# Patient Record
Sex: FEMALE | Race: ASIAN | Employment: UNEMPLOYED | URBAN - METROPOLITAN AREA
[De-identification: names, ages, dates, MRNs, and addresses within clinical notes are randomized per-mention and may not be internally consistent; named-entity substitution may affect disease eponyms.]

---

## 2017-09-03 PROBLEM — F41.9 ANXIETY: Status: ACTIVE | Noted: 2017-09-03

## 2017-09-03 PROBLEM — Z72.0 TOBACCO USE: Status: ACTIVE | Noted: 2017-09-03

## 2018-09-05 PROBLEM — O09.529 AMA (ADVANCED MATERNAL AGE) MULTIGRAVIDA 35+: Status: ACTIVE | Noted: 2018-09-05

## 2018-09-05 PROCEDURE — 88175 CYTOPATH C/V AUTO FLUID REDO: CPT | Performed by: OBSTETRICS & GYNECOLOGY

## 2018-09-05 PROCEDURE — 87086 URINE CULTURE/COLONY COUNT: CPT | Performed by: OBSTETRICS & GYNECOLOGY

## 2018-09-05 PROCEDURE — 87624 HPV HI-RISK TYP POOLED RSLT: CPT | Performed by: OBSTETRICS & GYNECOLOGY

## 2018-09-10 PROCEDURE — 86780 TREPONEMA PALLIDUM: CPT | Performed by: OBSTETRICS & GYNECOLOGY

## 2018-09-10 PROCEDURE — 86850 RBC ANTIBODY SCREEN: CPT | Performed by: OBSTETRICS & GYNECOLOGY

## 2018-09-10 PROCEDURE — 86900 BLOOD TYPING SEROLOGIC ABO: CPT | Performed by: OBSTETRICS & GYNECOLOGY

## 2018-09-10 PROCEDURE — 86901 BLOOD TYPING SEROLOGIC RH(D): CPT | Performed by: OBSTETRICS & GYNECOLOGY

## 2018-09-10 PROCEDURE — 86762 RUBELLA ANTIBODY: CPT | Performed by: OBSTETRICS & GYNECOLOGY

## 2018-09-10 PROCEDURE — 87389 HIV-1 AG W/HIV-1&-2 AB AG IA: CPT | Performed by: OBSTETRICS & GYNECOLOGY

## 2018-11-15 NOTE — PROGRESS NOTES
Outpatient Maternal-Fetal Medicine Consultation    Dear Dr. Maciej Durant,    Thank you for requesting ultrasound evaluation and maternal fetal medicine consultation on your patient Carlton Buerger.   As you are aware she is a 43year old female with a [place] pr 07/03/2018   General: alert and oriented,no acute distress  Abdomen: gravid, soft, non-tender  Extremities: non-tender, no edema      OBSTETRIC ULTRASOUND  A level II ultrasound was performed prior to the consultation which I interpreted and discussed with and is as high as 28 percent in women over age 48. The incidence of gestational diabetes in the general obstetric population is 3 percent, rising to 7 to 15 percent in women over age 36 and 21 percent in women over age 48.   Women 28years of age or older cannot reliably exclude potential genetic abnormalities. Given that the patient will be 43years old at the time of delivery I reviewed that her risk (at amniocentesis) of having a fetus with any chromosome abnormality is 1:50 and with trisomy 24 is 1: 80. fetal Zika virus infection. The minimum time between occurrence of maternal Zika virus infection and development of sonographic signs suggestive of fetal infection is not known.  In women infected early in pregnancy, ultrasound findings associated with f infection with an increased risk for congenital microcephaly and other abnormalities of the brain and eye prompted the [place]  Centers for Disease Control and Prevention (CDC) to issue a travel advisory for pregnant women recommending that “Pregnant women in pregnancy, 21 (26%) relapsed compared with 44 (68%) of the 72 women who discontinued medication.  This study estimated that women who discontinued medication were 5 times as likely to relapse as compared to women who maintained treatment.     Decisions rega major congenital malformations in newborns born in the [place] is estimated to be between 2 and 4%. During the earliest stages of pregnancy, formation of major organ systems takes place and is complete within the first 12 weeks after conception.  Ther (CNS) remains particularly vulnerable to toxic agents throughout pregnancy.  While exposures to teratogens early in pregnancy may result in clear abnormalities, exposures that occur after neural tube closure (at 32 days of gestation) may produce more subtle

## 2018-11-20 ENCOUNTER — HOSPITAL ENCOUNTER (OUTPATIENT)
Dept: PERINATAL CARE | Facility: HOSPITAL | Age: 38
Discharge: HOME | End: 2018-11-20
Attending: OBSTETRICS & GYNECOLOGY
Payer: COMMERCIAL

## 2018-11-20 VITALS — SYSTOLIC BLOOD PRESSURE: 104 MMHG | DIASTOLIC BLOOD PRESSURE: 60 MMHG | HEART RATE: 84 BPM

## 2018-11-20 VITALS — SYSTOLIC BLOOD PRESSURE: 111 MMHG | HEART RATE: 96 BPM | DIASTOLIC BLOOD PRESSURE: 61 MMHG

## 2018-11-20 DIAGNOSIS — Z20.821 ZIKA VIRUS EXPOSURE: ICD-10-CM

## 2018-11-20 DIAGNOSIS — F41.9 ANXIETY: ICD-10-CM

## 2018-11-20 DIAGNOSIS — O09.529 AMA (ADVANCED MATERNAL AGE) MULTIGRAVIDA 35+: ICD-10-CM

## 2018-11-20 DIAGNOSIS — O09.522 MULTIGRAVIDA OF ADVANCED MATERNAL AGE IN SECOND TRIMESTER: ICD-10-CM

## 2018-11-20 DIAGNOSIS — O09.529 AMA (ADVANCED MATERNAL AGE) MULTIGRAVIDA 35+: Primary | ICD-10-CM

## 2018-11-20 PROCEDURE — 76811 OB US DETAILED SNGL FETUS: CPT | Performed by: OBSTETRICS & GYNECOLOGY

## 2018-11-20 PROCEDURE — 99243 OFF/OP CNSLTJ NEW/EST LOW 30: CPT | Performed by: OBSTETRICS & GYNECOLOGY

## 2018-11-20 NOTE — PROGRESS NOTES
Pt for level 2 US  HX ama  zika exposure [place] and [place]  Pt states she had bits and no s/s partner did not have bites or S/S  Low risk cfdna female fetus  Denies pregnancy complaints  States active fetus

## 2019-01-11 PROCEDURE — 87389 HIV-1 AG W/HIV-1&-2 AB AG IA: CPT | Performed by: OBSTETRICS & GYNECOLOGY

## 2019-01-11 PROCEDURE — 86780 TREPONEMA PALLIDUM: CPT | Performed by: OBSTETRICS & GYNECOLOGY

## 2019-02-13 ENCOUNTER — HOSPITAL ENCOUNTER (OUTPATIENT)
Dept: PERINATAL CARE | Facility: HOSPITAL | Age: 39
Discharge: HOME | End: 2019-02-13
Attending: OBSTETRICS & GYNECOLOGY
Payer: COMMERCIAL

## 2019-02-13 VITALS — HEART RATE: 85 BPM | SYSTOLIC BLOOD PRESSURE: 121 MMHG | DIASTOLIC BLOOD PRESSURE: 72 MMHG

## 2019-02-13 DIAGNOSIS — O09.523 MULTIGRAVIDA OF ADVANCED MATERNAL AGE IN THIRD TRIMESTER: ICD-10-CM

## 2019-02-13 DIAGNOSIS — Z72.0 TOBACCO USE: ICD-10-CM

## 2019-02-13 DIAGNOSIS — O09.523 MULTIGRAVIDA OF ADVANCED MATERNAL AGE IN THIRD TRIMESTER: Primary | ICD-10-CM

## 2019-02-13 PROCEDURE — 76805 OB US >/= 14 WKS SNGL FETUS: CPT | Performed by: OBSTETRICS & GYNECOLOGY

## 2019-02-13 PROCEDURE — 99213 OFFICE O/P EST LOW 20 MIN: CPT | Performed by: OBSTETRICS & GYNECOLOGY

## 2019-02-13 NOTE — PROGRESS NOTES
Outpatient Maternal-Fetal Medicine Consultation    Dear Dr. Ania Escalona,    Thank you for requesting ultrasound evaluation and maternal fetal medicine consultation on your patient Diana Mooney.   As you are aware she is a 43year old female with a [place] pr gravid, soft, non-tender  Extremities: non-tender, no edema        DISCUSSION  During her visit we discussed and reviewed the following issues:  ADVANCED MATERNAL AGE    Background  I reviewed with the patient that pregnancies in women of advanced maternal

## 2019-03-28 ENCOUNTER — HOSPITAL ENCOUNTER (INPATIENT)
Facility: HOSPITAL | Age: 39
LOS: 3 days | Discharge: HOME | End: 2019-03-31
Attending: OBSTETRICS & GYNECOLOGY | Admitting: OBSTETRICS & GYNECOLOGY
Payer: COMMERCIAL

## 2019-03-28 PROBLEM — Z3A.38 38 WEEKS GESTATION OF PREGNANCY: Status: ACTIVE | Noted: 2019-03-28

## 2019-03-28 PROCEDURE — 85027 COMPLETE CBC AUTOMATED: CPT | Performed by: OBSTETRICS & GYNECOLOGY

## 2019-03-28 PROCEDURE — 86850 RBC ANTIBODY SCREEN: CPT | Performed by: OBSTETRICS & GYNECOLOGY

## 2019-03-28 PROCEDURE — 86900 BLOOD TYPING SEROLOGIC ABO: CPT | Performed by: OBSTETRICS & GYNECOLOGY

## 2019-03-28 PROCEDURE — 86901 BLOOD TYPING SEROLOGIC RH(D): CPT | Performed by: OBSTETRICS & GYNECOLOGY

## 2019-03-28 RX ORDER — AMMONIA INHALANTS 0.04 G/.3ML
0.3 INHALANT RESPIRATORY (INHALATION) AS NEEDED
Status: DISCONTINUED | OUTPATIENT
Start: 2019-03-28 | End: 2019-03-29

## 2019-03-28 RX ORDER — TERBUTALINE SULFATE 1 MG/ML
0.25 INJECTION, SOLUTION SUBCUTANEOUS AS NEEDED
Status: DISCONTINUED | OUTPATIENT
Start: 2019-03-28 | End: 2019-03-29 | Stop reason: HOSPADM

## 2019-03-28 RX ORDER — NALBUPHINE HCL 10 MG/ML
2.5 AMPUL (ML) INJECTION
Status: DISCONTINUED | OUTPATIENT
Start: 2019-03-28 | End: 2019-03-29 | Stop reason: ALTCHOICE

## 2019-03-28 RX ORDER — LIDOCAINE HYDROCHLORIDE AND EPINEPHRINE 20; 5 MG/ML; UG/ML
20 INJECTION, SOLUTION EPIDURAL; INFILTRATION; INTRACAUDAL; PERINEURAL ONCE
Status: COMPLETED | OUTPATIENT
Start: 2019-03-28 | End: 2019-03-28

## 2019-03-28 RX ORDER — IBUPROFEN 600 MG/1
600 TABLET ORAL ONCE AS NEEDED
Status: DISCONTINUED | OUTPATIENT
Start: 2019-03-28 | End: 2019-03-29 | Stop reason: HOSPADM

## 2019-03-28 RX ORDER — EPHEDRINE SULFATE/0.9% NACL/PF 25 MG/5 ML
5 SYRINGE (ML) INTRAVENOUS AS NEEDED
Status: DISCONTINUED | OUTPATIENT
Start: 2019-03-28 | End: 2019-03-29 | Stop reason: ALTCHOICE

## 2019-03-28 RX ORDER — SODIUM CHLORIDE, SODIUM LACTATE, POTASSIUM CHLORIDE, CALCIUM CHLORIDE 600; 310; 30; 20 MG/100ML; MG/100ML; MG/100ML; MG/100ML
INJECTION, SOLUTION INTRAVENOUS CONTINUOUS
Status: DISCONTINUED | OUTPATIENT
Start: 2019-03-28 | End: 2019-03-29 | Stop reason: HOSPADM

## 2019-03-28 RX ORDER — LIDOCAINE HYDROCHLORIDE 10 MG/ML
30 INJECTION, SOLUTION EPIDURAL; INFILTRATION; INTRACAUDAL; PERINEURAL ONCE
Status: DISCONTINUED | OUTPATIENT
Start: 2019-03-28 | End: 2019-03-29 | Stop reason: HOSPADM

## 2019-03-28 RX ORDER — BUPIVACAINE HYDROCHLORIDE 2.5 MG/ML
15 INJECTION, SOLUTION EPIDURAL; INFILTRATION; INTRACAUDAL ONCE
Status: COMPLETED | OUTPATIENT
Start: 2019-03-28 | End: 2019-03-28

## 2019-03-28 RX ORDER — ACETAMINOPHEN 500 MG
500 TABLET ORAL ONCE AS NEEDED
Status: DISCONTINUED | OUTPATIENT
Start: 2019-03-28 | End: 2019-03-29 | Stop reason: HOSPADM

## 2019-03-28 RX ORDER — SODIUM CHLORIDE 0.9 % (FLUSH) 0.9 %
10 SYRINGE (ML) INJECTION AS NEEDED
Status: DISCONTINUED | OUTPATIENT
Start: 2019-03-28 | End: 2019-03-29 | Stop reason: HOSPADM

## 2019-03-28 RX ORDER — TRISODIUM CITRATE DIHYDRATE AND CITRIC ACID MONOHYDRATE 500; 334 MG/5ML; MG/5ML
30 SOLUTION ORAL AS NEEDED
Status: DISCONTINUED | OUTPATIENT
Start: 2019-03-28 | End: 2019-03-29 | Stop reason: HOSPADM

## 2019-03-28 RX ORDER — ONDANSETRON 2 MG/ML
4 INJECTION INTRAMUSCULAR; INTRAVENOUS EVERY 6 HOURS PRN
Status: DISCONTINUED | OUTPATIENT
Start: 2019-03-28 | End: 2019-03-29 | Stop reason: HOSPADM

## 2019-03-28 RX ADMIN — LIDOCAINE HYDROCHLORIDE 3 ML: 10 INJECTION, SOLUTION EPIDURAL; INFILTRATION; INTRACAUDAL; PERINEURAL at 23:51:00

## 2019-03-29 ENCOUNTER — ANESTHESIA EVENT (OUTPATIENT)
Dept: OBGYN UNIT | Facility: HOSPITAL | Age: 39
End: 2019-03-29
Payer: COMMERCIAL

## 2019-03-29 ENCOUNTER — ANESTHESIA (OUTPATIENT)
Dept: OBGYN UNIT | Facility: HOSPITAL | Age: 39
End: 2019-03-29
Payer: COMMERCIAL

## 2019-03-29 PROCEDURE — 0HQ9XZZ REPAIR PERINEUM SKIN, EXTERNAL APPROACH: ICD-10-PCS | Performed by: OBSTETRICS & GYNECOLOGY

## 2019-03-29 RX ORDER — FAMOTIDINE 20 MG/1
20 TABLET ORAL 2 TIMES DAILY
Status: DISCONTINUED | OUTPATIENT
Start: 2019-03-29 | End: 2019-03-31

## 2019-03-29 RX ORDER — IBUPROFEN 200 MG
400 TABLET ORAL EVERY 4 HOURS PRN
Status: DISCONTINUED | OUTPATIENT
Start: 2019-03-29 | End: 2019-03-31

## 2019-03-29 RX ORDER — LIDOCAINE HYDROCHLORIDE AND EPINEPHRINE 15; 5 MG/ML; UG/ML
INJECTION, SOLUTION EPIDURAL AS NEEDED
Status: DISCONTINUED | OUTPATIENT
Start: 2019-03-29 | End: 2019-03-29 | Stop reason: SURG

## 2019-03-29 RX ORDER — PRENATAL VIT,CAL 76/IRON/FOLIC 29 MG-1 MG
1 TABLET ORAL DAILY
Status: DISCONTINUED | OUTPATIENT
Start: 2019-03-29 | End: 2019-03-31

## 2019-03-29 RX ORDER — BISACODYL 10 MG
10 SUPPOSITORY, RECTAL RECTAL ONCE AS NEEDED
Status: DISCONTINUED | OUTPATIENT
Start: 2019-03-29 | End: 2019-03-31

## 2019-03-29 RX ORDER — FAMOTIDINE 10 MG/ML
20 INJECTION, SOLUTION INTRAVENOUS 2 TIMES DAILY
Status: DISCONTINUED | OUTPATIENT
Start: 2019-03-29 | End: 2019-03-31

## 2019-03-29 RX ORDER — IBUPROFEN 200 MG
200 TABLET ORAL EVERY 4 HOURS PRN
Status: DISCONTINUED | OUTPATIENT
Start: 2019-03-29 | End: 2019-03-31

## 2019-03-29 RX ORDER — EPHEDRINE SULFATE/0.9% NACL/PF 25 MG/5 ML
5 SYRINGE (ML) INTRAVENOUS AS NEEDED
Status: DISCONTINUED | OUTPATIENT
Start: 2019-03-29 | End: 2019-03-29 | Stop reason: ALTCHOICE

## 2019-03-29 RX ORDER — HYDROCODONE BITARTRATE AND ACETAMINOPHEN 5; 325 MG/1; MG/1
2 TABLET ORAL EVERY 4 HOURS PRN
Status: DISCONTINUED | OUTPATIENT
Start: 2019-03-29 | End: 2019-03-31

## 2019-03-29 RX ORDER — IBUPROFEN 600 MG/1
600 TABLET ORAL EVERY 6 HOURS
Status: DISCONTINUED | OUTPATIENT
Start: 2019-03-29 | End: 2019-03-31

## 2019-03-29 RX ORDER — LIDOCAINE HYDROCHLORIDE 10 MG/ML
INJECTION, SOLUTION EPIDURAL; INFILTRATION; INTRACAUDAL; PERINEURAL AS NEEDED
Status: DISCONTINUED | OUTPATIENT
Start: 2019-03-28 | End: 2019-03-29 | Stop reason: SURG

## 2019-03-29 RX ORDER — NALBUPHINE HCL 10 MG/ML
2.5 AMPUL (ML) INJECTION
Status: DISCONTINUED | OUTPATIENT
Start: 2019-03-29 | End: 2019-03-29 | Stop reason: ALTCHOICE

## 2019-03-29 RX ORDER — SODIUM CHLORIDE 0.9 % (FLUSH) 0.9 %
10 SYRINGE (ML) INJECTION AS NEEDED
Status: DISCONTINUED | OUTPATIENT
Start: 2019-03-29 | End: 2019-03-31

## 2019-03-29 RX ORDER — HYDROCODONE BITARTRATE AND ACETAMINOPHEN 5; 325 MG/1; MG/1
1 TABLET ORAL EVERY 4 HOURS PRN
Status: DISCONTINUED | OUTPATIENT
Start: 2019-03-29 | End: 2019-03-31

## 2019-03-29 RX ORDER — DIAPER,BRIEF,INFANT-TODD,DISP
1 EACH MISCELLANEOUS EVERY 6 HOURS PRN
Status: DISCONTINUED | OUTPATIENT
Start: 2019-03-29 | End: 2019-03-31

## 2019-03-29 RX ORDER — DOCUSATE SODIUM 100 MG/1
100 CAPSULE, LIQUID FILLED ORAL 2 TIMES DAILY
Status: DISCONTINUED | OUTPATIENT
Start: 2019-03-29 | End: 2019-03-31

## 2019-03-29 RX ORDER — IBUPROFEN 600 MG/1
600 TABLET ORAL EVERY 4 HOURS PRN
Status: DISCONTINUED | OUTPATIENT
Start: 2019-03-29 | End: 2019-03-31

## 2019-03-29 RX ORDER — ONDANSETRON 2 MG/ML
4 INJECTION INTRAMUSCULAR; INTRAVENOUS EVERY 6 HOURS PRN
Status: DISCONTINUED | OUTPATIENT
Start: 2019-03-29 | End: 2019-03-31

## 2019-03-29 RX ORDER — AMMONIA INHALANTS 0.04 G/.3ML
0.3 INHALANT RESPIRATORY (INHALATION) AS NEEDED
Status: DISCONTINUED | OUTPATIENT
Start: 2019-03-29 | End: 2019-03-31

## 2019-03-29 RX ORDER — SIMETHICONE 80 MG
80 TABLET,CHEWABLE ORAL 3 TIMES DAILY PRN
Status: DISCONTINUED | OUTPATIENT
Start: 2019-03-29 | End: 2019-03-31

## 2019-03-29 RX ADMIN — LIDOCAINE HYDROCHLORIDE AND EPINEPHRINE 3 ML: 15; 5 INJECTION, SOLUTION EPIDURAL at 00:00:00

## 2019-03-29 NOTE — PROGRESS NOTES
[facility]    Labor Progress Note    Fernandez Julien Patient Status:  Inpatient    10/3/1980 MRN [id number]   Location [address]  Attending Kennedi Jerome MD   Hosp Day # 1 PCP DO Keven Mcgrath

## 2019-03-29 NOTE — H&P
[facility] Patient Status:  Observation    10/3/1980 MRN [id number]   Location [facility] Attending Ronnie Parkinson MD   Hosp Day # 0 PCP Haim Bhardwaj DO     Active Problems:    38 week dry.   Psychiatric: She has a normal mood and affect. Her behavior is normal. Judgment and thought content normal.   Vitals reviewed.       Assessment:  37yo  at 38+2 in labor    -- hx of SVDx2  -- GBS neg  -- AMA with normal level 2  -- mild anemia

## 2019-03-29 NOTE — PAYOR COMM NOTE
--------------  ADMISSION REVIEW     Payor: [employer] PPO  Subscriber #:  [id number]  Authorization Number: [id number]    Admit date: 3/28/19  Admit time: 2038       Admitting Physician: Ronnie Parkinson MD  Attending Physician:  Natalia Watkins

## 2019-03-29 NOTE — ANESTHESIA PREPROCEDURE EVALUATION
Anesthesia PreOp Note    HPI:     Shaina Tobar is a 43year old female who presents for preoperative consultation requested by: * No surgeons listed *    Date of Surgery: 3/28/2019 - 3/29/2019    * No procedures listed *  Indication: * No pre-op diagnos infusion 300 mL/hr Intravenous Continuous Vira Peña MD Stopped at 03/28/19 2030    Terbutaline Sulfate (BRETHINE) 1 MG/ML injection 0.25 mg 0.25 mg Subcutaneous PRN Vira Peña MD     Sod Citrate-Citric Acid [facility]) solution 30 mL 30 mL on file    Occupational History      Not on file    Social Needs      Financial resource strain: Not on file      Food insecurity:        Worry: Not on file        Inability: Not on file      Transportation needs:        Medical: Not on file        Non-med °F (36.7 °C). Her blood pressure is 116/70 and her pulse is 87.  Her respiration is 16.    03/28/19  2200 03/28/19  2300   BP: 104/56 116/70   Pulse: 78 87   Resp: 16    Temp: 98.1 °F (36.7 °C)    TempSrc: Oral    Weight: 70.8 kg (156 lb)    Height: 1.575 m

## 2019-03-29 NOTE — LACTATION NOTE
LACTATION NOTE - MOTHER      Evaluation Type: Inpatient    Problems identified  Problems identified: Knowledge deficit    Maternal history  Maternal history: Anxiety    Breastfeeding goal  Breastfeeding goal: To maintain breast milk feeding per patient Elton Waterman means or via a bottle. Post-discharge breastfeeding resources reviewed and encouraged to call for assistance with breastfeeding attempts as needed. RESPONSE: Patient accepted information and verbalized understanding of information. Will follow PRN.

## 2019-03-29 NOTE — ANESTHESIA POSTPROCEDURE EVALUATION
Patient: Mayela Man    Procedure Summary     Date:  03/28/19 Room / Location:      Anesthesia Start:  2348 Anesthesia Stop:  03/29/19 0540    Procedure:  LABOR ANALGESIA Diagnosis:      Scheduled Providers:   Anesthesiologist:  Kasia Osorio MD

## 2019-03-29 NOTE — ANESTHESIA PROCEDURE NOTES
Labor Analgesia  Performed by: Julia Singleton MD  Authorized by: Julia Singleton MD     Patient Location:  OB  Start Time:  3/28/2019 11:48 PM  End Time:  3/29/2019 12:05 AM  Site Identification: surface landmarks    Reason for Block: labor epidur

## 2019-03-29 NOTE — L&D DELIVERY NOTE
[facility]    Vaginal Delivery Note    Jarocho Lav Patient Status:  Inpatient    10/3/1980 MRN [id number]   Location [address] Attending Marisela Gama MD   [facility] Day # 1 PCP DO Viridiana Herrera

## 2019-03-30 PROBLEM — Z3A.38 38 WEEKS GESTATION OF PREGNANCY: Status: RESOLVED | Noted: 2019-03-28 | Resolved: 2019-03-30

## 2019-03-30 PROBLEM — O09.529 AMA (ADVANCED MATERNAL AGE) MULTIGRAVIDA 35+: Status: RESOLVED | Noted: 2018-09-05 | Resolved: 2019-03-30

## 2019-03-30 PROCEDURE — 97116 GAIT TRAINING THERAPY: CPT

## 2019-03-30 PROCEDURE — 97162 PT EVAL MOD COMPLEX 30 MIN: CPT

## 2019-03-30 PROCEDURE — 85025 COMPLETE CBC W/AUTO DIFF WBC: CPT | Performed by: OBSTETRICS & GYNECOLOGY

## 2019-03-30 PROCEDURE — 97530 THERAPEUTIC ACTIVITIES: CPT

## 2019-03-30 NOTE — PROGRESS NOTES
[place] [facility] - [facility]    OB/GYNE Progress Note      Shaina Tobar Patient Status:  Inpatient    10/3/1980 MRN [id number]   Location [facility] 3SE Attending Cassandra Morejon MD   Hosp Day # 2 PCP Caitie Gilbert,        Assessment/Plan

## 2019-03-30 NOTE — PLAN OF CARE
GENITOURINARY - ADULT    • Absence of urinary retention Completed          PAIN - ADULT    • Verbalizes/displays adequate comfort level or patient's stated pain goal Not Progressing          ANXIETY    • Will report anxiety at manageable levels Progressing

## 2019-03-30 NOTE — PHYSICAL THERAPY NOTE
PHYSICAL THERAPY EVALUATION - INPATIENT     Room Number: 353/353-A  Evaluation Date: 3/30/2019  Type of Evaluation: Initial   Physician Order: PT Eval and Treat    Presenting Problem: pubic symphysis pain  Reason for Therapy: Mobility Dysfunction and Disc time that may aggravate symptoms, encouraged to use log roll technique, pair transfers with practiced breathing technique, and use abdominal binder for comfort.  Pt and  educated on recommendation for continued PT with OPPT services once d/c'd from h to Enter : 5  Railing: Yes  Stairs to Bedroom: 18  Railing: Yes    Lives With: Spouse  Drives: Yes  Patient Owned Equipment: None  Patient Regularly Uses: None    Prior Level of Carl[place]: indep    SUBJECTIVE  \"it hurts more on the left side, and when Supervision  Distance (ft): 50  Assistive Device: None  Pattern: (very slow gait speed, decreased stride length)  Stoop/Curb Assistance: Not tested       Bed Mobility: initially min A, progress to SV by end of session with use of binder    Transfers: SV

## 2019-03-30 NOTE — LACTATION NOTE
LACTATION NOTE - MOTHER      Evaluation Type: Inpatient    Problems identified  Problems identified: Knowledge deficit;Milk supply WNL    Maternal history  Maternal history: Anxiety;AMA  Other/comment: PT for pubic symphisis pain.     Breastfeeding goal  Br

## 2019-03-31 VITALS
DIASTOLIC BLOOD PRESSURE: 75 MMHG | OXYGEN SATURATION: 97 % | WEIGHT: 156 LBS | RESPIRATION RATE: 16 BRPM | HEART RATE: 70 BPM | SYSTOLIC BLOOD PRESSURE: 122 MMHG | BODY MASS INDEX: 28.71 KG/M2 | HEIGHT: 62 IN | TEMPERATURE: 98 F

## 2019-03-31 RX ORDER — IBUPROFEN 600 MG/1
600 TABLET ORAL EVERY 4 HOURS PRN
Qty: 60 TABLET | Refills: 0 | Status: SHIPPED | OUTPATIENT
Start: 2019-03-31 | End: 2019-05-17

## 2019-03-31 RX ORDER — HYDROCODONE BITARTRATE AND ACETAMINOPHEN 5; 325 MG/1; MG/1
1 TABLET ORAL EVERY 4 HOURS PRN
Qty: 12 TABLET | Refills: 0 | Status: SHIPPED | OUTPATIENT
Start: 2019-03-31 | End: 2019-05-17

## 2019-03-31 NOTE — DISCHARGE SUMMARY
[place] [facility] - [facility]    Discharge Summary    Aliya Thomas Patient Status:  Inpatient    10/3/1980 MRN [id number]   Location [facility] 3SE Attending Colton Saldana MD   [facility] Day # 3 PCP Cristina Rollins DO     Date of Admission: 3/28/

## 2019-03-31 NOTE — LACTATION NOTE
LACTATION NOTE - MOTHER      Evaluation Type: Inpatient    Problems identified  Problems identified: Knowledge deficit;Milk supply not WNL  Milk supply not WNL: Reduced (potential)  Problems Identified Other: Began formula supplementation without pumping.

## 2019-03-31 NOTE — LACTATION NOTE
This note was copied from a baby's chart. LACTATION NOTE - INFANT    Evaluation Type  Evaluation Type: Inpatient    Problems & Assessment  Problems Diagnosed or Identified: 37-38 weeks gestation; Excessive weight loss  Problems: comment/detail: Weight loss

## 2019-04-03 ENCOUNTER — TELEPHONE (OUTPATIENT)
Dept: LACTATION | Facility: HOSPITAL | Age: 39
End: 2019-04-03

## 2019-04-03 NOTE — TELEPHONE ENCOUNTER
Pt states that she is doing well after following Misa's engorgement suggestions. Pt advised to call with further questions.

## 2019-10-30 ENCOUNTER — OFFICE VISIT (OUTPATIENT)
Dept: FAMILY MEDICINE CLINIC | Facility: CLINIC | Age: 39
End: 2019-10-30
Payer: COMMERCIAL

## 2019-10-30 VITALS
HEIGHT: 62 IN | BODY MASS INDEX: 25.23 KG/M2 | WEIGHT: 137.13 LBS | TEMPERATURE: 99 F | SYSTOLIC BLOOD PRESSURE: 118 MMHG | DIASTOLIC BLOOD PRESSURE: 76 MMHG | HEART RATE: 61 BPM

## 2019-10-30 DIAGNOSIS — Z12.39 SCREENING FOR BREAST CANCER: ICD-10-CM

## 2019-10-30 DIAGNOSIS — Z00.00 ANNUAL PHYSICAL EXAM: Primary | ICD-10-CM

## 2019-10-30 DIAGNOSIS — Z82.49 FAMILY HISTORY OF HEART DISEASE: ICD-10-CM

## 2019-10-30 PROCEDURE — 93000 ELECTROCARDIOGRAM COMPLETE: CPT | Performed by: FAMILY MEDICINE

## 2019-10-30 PROCEDURE — 99386 PREV VISIT NEW AGE 40-64: CPT | Performed by: FAMILY MEDICINE

## 2019-10-30 NOTE — PROGRESS NOTES
HPI:    Leigha Maldonado is a 35year old female presents to clinic for an annual physical exam. New patient. No acute concerns. Had a baby 6 months back, no longer breast-feeding as she is back at work.   Normal appetite, is trying to eat healthy jose carlos canal normal.   Nose: Nose normal.   Mouth/Throat: Uvula is midline, oropharynx is clear and moist and mucous membranes are normal.   Eyes: Pupils are equal, round, and reactive to light. Conjunctivae and EOM are normal.   Neck: Normal range of motion.  Nec

## 2019-12-04 ENCOUNTER — HOSPITAL ENCOUNTER (OUTPATIENT)
Dept: MAMMOGRAPHY | Age: 39
Discharge: HOME | End: 2019-12-04
Attending: FAMILY MEDICINE
Payer: COMMERCIAL

## 2019-12-04 DIAGNOSIS — Z12.39 SCREENING FOR BREAST CANCER: ICD-10-CM

## 2019-12-04 PROCEDURE — 77067 SCR MAMMO BI INCL CAD: CPT | Performed by: FAMILY MEDICINE

## 2019-12-04 PROCEDURE — 77063 BREAST TOMOSYNTHESIS BI: CPT | Performed by: FAMILY MEDICINE

## 2019-12-05 ENCOUNTER — NURSE TRIAGE (OUTPATIENT)
Dept: FAMILY MEDICINE CLINIC | Facility: CLINIC | Age: 39
End: 2019-12-05

## 2019-12-05 NOTE — TELEPHONE ENCOUNTER
Patient called with shortness of breath and mentioned anxiety. Patient refused to talk to nurse and disconnected call. Thank you and please be aware.

## 2019-12-05 NOTE — TELEPHONE ENCOUNTER
Action Requested: Summary for Provider     []  Critical Lab, Recommendations Needed  [] Need Additional Advice  []   FYI    []   Need Orders  [] Need Medications Sent to Pharmacy  []  Other     SUMMARY: appt made for Monday with Dr. Jean Claude Ortiz.     Reason for

## 2020-07-01 ENCOUNTER — NURSE ONLY (OUTPATIENT)
Dept: FAMILY MEDICINE CLINIC | Facility: CLINIC | Age: 40
End: 2020-07-01
Payer: COMMERCIAL

## 2020-07-01 DIAGNOSIS — Z86.19 HISTORY OF VIRAL DISEASE: ICD-10-CM

## 2020-07-02 LAB — SARS-COV-2 IGG SERPLBLD QL IA.RAPID: NEGATIVE

## 2020-10-11 ENCOUNTER — HOSPITAL ENCOUNTER (OUTPATIENT)
Age: 40
Discharge: HOME | End: 2020-10-11
Payer: COMMERCIAL

## 2020-10-11 VITALS
HEIGHT: 62 IN | WEIGHT: 130 LBS | BODY MASS INDEX: 23.92 KG/M2 | HEART RATE: 75 BPM | DIASTOLIC BLOOD PRESSURE: 89 MMHG | RESPIRATION RATE: 18 BRPM | TEMPERATURE: 98 F | OXYGEN SATURATION: 100 % | SYSTOLIC BLOOD PRESSURE: 123 MMHG

## 2020-10-11 DIAGNOSIS — R09.81 CONGESTION OF NASAL SINUS: ICD-10-CM

## 2020-10-11 DIAGNOSIS — R51.9 ACUTE NONINTRACTABLE HEADACHE, UNSPECIFIED HEADACHE TYPE: ICD-10-CM

## 2020-10-11 DIAGNOSIS — Z20.822 ENCOUNTER FOR LABORATORY TESTING FOR COVID-19 VIRUS: Primary | ICD-10-CM

## 2020-10-11 PROCEDURE — 99202 OFFICE O/P NEW SF 15 MIN: CPT | Performed by: EMERGENCY MEDICINE

## 2020-10-11 PROCEDURE — U0003 INFECTIOUS AGENT DETECTION BY NUCLEIC ACID (DNA OR RNA); SEVERE ACUTE RESPIRATORY SYNDROME CORONAVIRUS 2 (SARS-COV-2) (CORONAVIRUS DISEASE [COVID-19]), AMPLIFIED PROBE TECHNIQUE, MAKING USE OF HIGH THROUGHPUT TECHNOLOGIES AS DESCRIBED BY CMS-2020-01-R: HCPCS | Performed by: EMERGENCY MEDICINE

## 2020-10-11 NOTE — ED INITIAL ASSESSMENT (HPI)
Per pt requesting covid testing, reports exposure all last week by child's , reports intermittent headaches since last week. Denies NV with headaches.

## 2020-10-11 NOTE — ED PROVIDER NOTES
[address]      History   Patient presents with:  Testing    Stated Complaint: testing    Tahira Llanos is a 38year old female here for covid testing after exposure.   Unsure what could make it worse normal.      Left Ear: External ear normal.      Nose: No congestion. Mouth/Throat:      Mouth: Mucous membranes are moist.      Pharynx: No oropharyngeal exudate.    Eyes:      Conjunctiva/sclera: Conjunctivae normal.      Pupils: Pupils are equal, ro self-isolation/quarantine and anticipatory guidance. Other than presenting complaint, exam was unremarkable. F/u care. Based on vitals and exam they are nontoxic and stable for discharge.                    Disposition and Plan     Clinical Impress

## 2020-10-16 ENCOUNTER — NURSE TRIAGE (OUTPATIENT)
Dept: FAMILY MEDICINE CLINIC | Facility: CLINIC | Age: 40
End: 2020-10-16

## 2020-10-16 NOTE — TELEPHONE ENCOUNTER
Spoke with pt,  verified  Pt informed of MD recommendation, pt stated understanding. Virtual appt made as requested.      Future Appointments   Date Time Provider Bella Radford   10/21/2020  4:45 PM Giselle Martinez MD [address] [place] [address]

## 2020-10-16 NOTE — TELEPHONE ENCOUNTER
Action Requested: Summary for Provider     []  Critical Lab, Recommendations Needed  [x] Need Additional Advice  []   FYI    []   Need Orders  [] Need Medications Sent to Pharmacy  []  Other     SUMMARY: pt asking to schedule appt for anxiety.  Pt states sh

## 2020-10-26 NOTE — PROGRESS NOTES
HPI:    Radha Chamberlain is a 38year old female presents for [employer] video visit with concerns regarding anxiety. Since the quarantine, patient has been working from home. Also reports that she is a 13month-old at home with her.   States that she wo for patient, feels in the past she handle stress well. I spoke to patient in detail regarding lifestyle modifications, having a set schedule regardless of working from home.   I did encourage patient to incorporate exercise into her schedule, and have a se

## 2020-11-11 ENCOUNTER — TELEMEDICINE (OUTPATIENT)
Dept: FAMILY MEDICINE CLINIC | Facility: CLINIC | Age: 40
End: 2020-11-11

## 2020-11-11 DIAGNOSIS — R07.89 CHEST DISCOMFORT: ICD-10-CM

## 2020-11-11 DIAGNOSIS — R09.81 NASAL CONGESTION: ICD-10-CM

## 2020-11-11 DIAGNOSIS — Z20.9 HISTORY OF EXPOSURE TO INFECTIOUS DISEASE: Primary | ICD-10-CM

## 2020-11-11 PROCEDURE — 99213 OFFICE O/P EST LOW 20 MIN: CPT | Performed by: FAMILY MEDICINE

## 2020-11-11 NOTE — PROGRESS NOTES
HPI:    Maribell Song is a 38year old female presents for video visit with concerns regarding Covid. Patient was with a friend on Thursday and Friday who ended up testing positive for COVID-19 on Saturday.   Yesterday, patient woke up with nasal con Advised to quarantine until test comes back negative, if positive needs to quarantine for at least 14 days from exposure. Supportive care encouraged. If she develops chest pain or difficulty breathing to proceed to the ER. Will continue to monitor    . P

## 2020-11-12 ENCOUNTER — HOSPITAL ENCOUNTER (OUTPATIENT)
Age: 40
Discharge: HOME | End: 2020-11-12
Payer: COMMERCIAL

## 2020-11-12 ENCOUNTER — TELEPHONE (OUTPATIENT)
Dept: FAMILY MEDICINE CLINIC | Facility: CLINIC | Age: 40
End: 2020-11-12

## 2020-11-12 VITALS
DIASTOLIC BLOOD PRESSURE: 74 MMHG | TEMPERATURE: 98 F | RESPIRATION RATE: 18 BRPM | HEART RATE: 81 BPM | SYSTOLIC BLOOD PRESSURE: 117 MMHG | OXYGEN SATURATION: 100 %

## 2020-11-12 DIAGNOSIS — R07.9 CHEST PAIN OF UNCERTAIN ETIOLOGY: Primary | ICD-10-CM

## 2020-11-12 DIAGNOSIS — Z20.822 ENCOUNTER FOR SCREENING LABORATORY TESTING FOR COVID-19 VIRUS: ICD-10-CM

## 2020-11-12 DIAGNOSIS — Z20.9 HISTORY OF EXPOSURE TO INFECTIOUS DISEASE: Primary | ICD-10-CM

## 2020-11-12 DIAGNOSIS — R52 BODY ACHES: ICD-10-CM

## 2020-11-12 DIAGNOSIS — B34.9 VIRAL ILLNESS: ICD-10-CM

## 2020-11-12 PROCEDURE — 93000 ELECTROCARDIOGRAM COMPLETE: CPT | Performed by: NURSE PRACTITIONER

## 2020-11-12 PROCEDURE — 99213 OFFICE O/P EST LOW 20 MIN: CPT | Performed by: NURSE PRACTITIONER

## 2020-11-12 NOTE — TELEPHONE ENCOUNTER
Patient stating nobody has reached out to her to schedule her Covid test. I do not see an order. Your office visit note indicated you were going to test patient. Should we order one? Please advise.

## 2020-11-13 NOTE — ED PROVIDER NOTES
Patient Seen in: [facility]      History   Patient presents with:   Body ache and/or chills  Headache  Chest Pain    Stated Complaint: Covid testing    HPI    This is a 70-year-old female presenting with body aches chills slight headache and Effort: Pulmonary effort is normal.      Breath sounds: Normal breath sounds.       Comments: Lung sounds are clear than auscultation no wheezing no hypoxia no respiratory distress  Chest:      Comments: Reproducible anterior chest wall tenderness to palpat instructions. This patient was discussed with Dr. Tanner Machuca.                    Disposition and Plan     Clinical Impression:  Chest pain of uncertain etiology  (primary encounter diagnosis)  Encounter for screening laboratory testing for COVID-19 virus  Vir

## 2020-11-13 NOTE — ED INITIAL ASSESSMENT (HPI)
Pt here with complaiints of body aches, chills , headache, and chest pain , pt states her symptoms started yesterday, pt states she was around a friend who testing +covid 5 days ago, pt denies any fevers

## 2020-11-16 ENCOUNTER — TELEPHONE (OUTPATIENT)
Dept: FAMILY MEDICINE CLINIC | Facility: CLINIC | Age: 40
End: 2020-11-16

## 2020-11-16 NOTE — TELEPHONE ENCOUNTER
Pt calling to state she was informed from IC that she is positive for covid. Per pt she has intermittent headaches, but no further symptoms. Pt denies difficulty breathing, fever.       Discussed CDC guidelines in regards toquarantine and pt verb unde

## 2020-11-23 ENCOUNTER — APPOINTMENT (OUTPATIENT)
Dept: LAB | Age: 40
End: 2020-11-23
Attending: FAMILY MEDICINE
Payer: COMMERCIAL

## 2020-11-23 DIAGNOSIS — Z20.9 HISTORY OF EXPOSURE TO INFECTIOUS DISEASE: ICD-10-CM

## 2020-11-23 DIAGNOSIS — R52 BODY ACHES: ICD-10-CM

## 2020-11-24 NOTE — TELEPHONE ENCOUNTER
Pt asking whats the next step -she ended the 14 day quarantine, advised, continue social distancing with mask , hydration, good hand washing

## 2020-11-28 ENCOUNTER — TELEPHONE (OUTPATIENT)
Dept: FAMILY MEDICINE CLINIC | Facility: CLINIC | Age: 40
End: 2020-11-28

## 2020-11-28 NOTE — TELEPHONE ENCOUNTER
Spoke with pt,  verified. Pt stated she was tested covid positive on 20, pt and her  was done with her quarantined on  but her 3 kids also positive for covid, her other kid got the result today. Pt is feeling better, denies sx.   She

## 2020-11-28 NOTE — TELEPHONE ENCOUNTER
No, does not need to extend quarantine due to exposure from 2 children.   She should quarantine until 10 days after onset of her symptoms

## 2020-12-18 ENCOUNTER — NURSE TRIAGE (OUTPATIENT)
Dept: FAMILY MEDICINE CLINIC | Facility: CLINIC | Age: 40
End: 2020-12-18

## 2020-12-18 NOTE — TELEPHONE ENCOUNTER
Please reply to pool: EM RN TRIAGE    Action Requested: Summary for Provider     []  Critical Lab, Recommendations Needed  [] Need Additional Advice  [x]   FYI    []   Need Orders  [] Need Medications Sent to Pharmacy  []  Other     SUMMARY: Strongly advis

## 2020-12-21 NOTE — TELEPHONE ENCOUNTER
Called patient to f/u with ER. She did not go because she is not having chest pains and more shortness of breath. States that she thinks it is anxiety. Recently diagnosed with Covid and denies any symptoms at this time.  Would like an appointment with Dr. Chaim Castleman

## 2020-12-21 NOTE — TELEPHONE ENCOUNTER
Spoke to Dr. Brando Head via [employer] and ok for virtual appointment tomorrow.    Future Appointments   Date Time Provider Bella Malina   12/22/2020  4:45 PM Suzy Olivares MD [address] [place] [address]

## 2020-12-22 ENCOUNTER — TELEMEDICINE (OUTPATIENT)
Dept: FAMILY MEDICINE CLINIC | Facility: CLINIC | Age: 40
End: 2020-12-22

## 2020-12-22 DIAGNOSIS — R07.89 CHEST TIGHTNESS: Primary | ICD-10-CM

## 2020-12-22 DIAGNOSIS — R00.2 PALPITATIONS: ICD-10-CM

## 2020-12-22 DIAGNOSIS — U07.1 COVID-19: ICD-10-CM

## 2020-12-22 PROCEDURE — 99214 OFFICE O/P EST MOD 30 MIN: CPT | Performed by: FAMILY MEDICINE

## 2020-12-22 NOTE — PROGRESS NOTES
HPI:    Sahil Garcia is a 38year old female presents for video visit via [employer] for follow-up. On 11/19, patient and her family tested positive for COVID-19. Patient felt her symptoms were mild, had low-grade fevers and a cough.   Feels she has Psychiatric: She has a normal mood and affect.        ASSESSMENT/PLAN:   (R07.89) Chest tightness  (primary encounter diagnosis)  (R00.2) Palpitations  (U07.1) COVID-19  Plan:   -I had a virtual visit with this patient in October about similar symptoms, m system; efforts to review and correct have been done but errors may still exist. Please contact me with any questions.        12/22/2020  Cb Francisco MD

## 2020-12-23 ENCOUNTER — OFFICE VISIT (OUTPATIENT)
Dept: FAMILY MEDICINE CLINIC | Facility: CLINIC | Age: 40
End: 2020-12-23
Payer: COMMERCIAL

## 2020-12-23 ENCOUNTER — HOSPITAL ENCOUNTER (OUTPATIENT)
Dept: GENERAL RADIOLOGY | Age: 40
Discharge: HOME | End: 2020-12-23
Attending: FAMILY MEDICINE
Payer: COMMERCIAL

## 2020-12-23 VITALS
HEIGHT: 62 IN | DIASTOLIC BLOOD PRESSURE: 89 MMHG | WEIGHT: 134.25 LBS | TEMPERATURE: 97 F | BODY MASS INDEX: 24.7 KG/M2 | SYSTOLIC BLOOD PRESSURE: 127 MMHG | HEART RATE: 76 BPM

## 2020-12-23 DIAGNOSIS — R07.89 CHEST DISCOMFORT: ICD-10-CM

## 2020-12-23 DIAGNOSIS — Z12.31 BREAST CANCER SCREENING BY MAMMOGRAM: ICD-10-CM

## 2020-12-23 DIAGNOSIS — R00.2 PALPITATIONS: ICD-10-CM

## 2020-12-23 DIAGNOSIS — R00.2 PALPITATIONS: Primary | ICD-10-CM

## 2020-12-23 PROCEDURE — 3079F DIAST BP 80-89 MM HG: CPT | Performed by: FAMILY MEDICINE

## 2020-12-23 PROCEDURE — 3074F SYST BP LT 130 MM HG: CPT | Performed by: FAMILY MEDICINE

## 2020-12-23 PROCEDURE — 99214 OFFICE O/P EST MOD 30 MIN: CPT | Performed by: FAMILY MEDICINE

## 2020-12-23 PROCEDURE — 3008F BODY MASS INDEX DOCD: CPT | Performed by: FAMILY MEDICINE

## 2020-12-23 PROCEDURE — 71046 X-RAY EXAM CHEST 2 VIEWS: CPT | Performed by: FAMILY MEDICINE

## 2020-12-23 PROCEDURE — 93000 ELECTROCARDIOGRAM COMPLETE: CPT | Performed by: FAMILY MEDICINE

## 2020-12-23 NOTE — PROGRESS NOTES
HPI:    Case Ibrahim is a 38year old female presents to clinic for follow-up. Had virtual visit with me yesterday discussing chest tightness, palpitations and discomfort following COVID-19 infection.   Patient feels she might of had the symptoms be Constitutional: No distress. HENT:   Head: Normocephalic and atraumatic. Neck: Normal range of motion. Neck supple. No thyromegaly present. Cardiovascular: Normal rate, regular rhythm, normal heart sounds and intact distal pulses.    Pulmonary/Chest

## 2021-01-15 ENCOUNTER — TELEPHONE (OUTPATIENT)
Dept: FAMILY MEDICINE CLINIC | Facility: CLINIC | Age: 41
End: 2021-01-15

## 2021-01-15 NOTE — TELEPHONE ENCOUNTER
Patient calling for XR and blood test results. States pcp was supposed to call her and discuss the results to advise her to a \"therapist\".     Please advise-  Thanks           Patient Result Comments for XR CHEST PA + LAT CHEST (XOP=[id number])    Written by Parker Saunders

## 2021-01-16 NOTE — TELEPHONE ENCOUNTER
Verified pt name and . Reviewed doctor's recommendations as noted below. Pt states she is driving and can't down phone number, information sent via [address]

## 2021-09-25 ENCOUNTER — MED REC SCAN ONLY (OUTPATIENT)
Dept: FAMILY MEDICINE CLINIC | Facility: CLINIC | Age: 41
End: 2021-09-25

## 2021-12-14 ENCOUNTER — TELEPHONE (OUTPATIENT)
Dept: FAMILY MEDICINE CLINIC | Facility: CLINIC | Age: 41
End: 2021-12-14

## 2021-12-14 NOTE — TELEPHONE ENCOUNTER
Patient is requesting a physical appointment in 12/2021 for her job and next physical appointment is on 1/27/2022. Can we use a RES 24 or any open slot?

## 2021-12-16 NOTE — TELEPHONE ENCOUNTER
Spoke, with the patient and informed her of the message below. Pt did schedule an appt to see Dr. Constantin Quinteros on 12-21-21 at 11:00 double book ok'd per the doctors message below.

## 2021-12-21 ENCOUNTER — LAB ENCOUNTER (OUTPATIENT)
Dept: LAB | Age: 41
End: 2021-12-21
Attending: FAMILY MEDICINE
Payer: COMMERCIAL

## 2021-12-21 ENCOUNTER — OFFICE VISIT (OUTPATIENT)
Dept: FAMILY MEDICINE CLINIC | Facility: CLINIC | Age: 41
End: 2021-12-21
Payer: COMMERCIAL

## 2021-12-21 VITALS
HEIGHT: 62 IN | OXYGEN SATURATION: 99 % | RESPIRATION RATE: 19 BRPM | WEIGHT: 133 LBS | BODY MASS INDEX: 24.48 KG/M2 | DIASTOLIC BLOOD PRESSURE: 78 MMHG | HEART RATE: 78 BPM | SYSTOLIC BLOOD PRESSURE: 121 MMHG

## 2021-12-21 DIAGNOSIS — Z12.31 BREAST CANCER SCREENING BY MAMMOGRAM: ICD-10-CM

## 2021-12-21 DIAGNOSIS — Z00.00 ANNUAL PHYSICAL EXAM: Primary | ICD-10-CM

## 2021-12-21 DIAGNOSIS — Z00.00 ANNUAL PHYSICAL EXAM: ICD-10-CM

## 2021-12-21 PROCEDURE — 81003 URINALYSIS AUTO W/O SCOPE: CPT

## 2021-12-21 PROCEDURE — 80053 COMPREHEN METABOLIC PANEL: CPT

## 2021-12-21 PROCEDURE — 99396 PREV VISIT EST AGE 40-64: CPT | Performed by: FAMILY MEDICINE

## 2021-12-21 PROCEDURE — 3074F SYST BP LT 130 MM HG: CPT | Performed by: FAMILY MEDICINE

## 2021-12-21 PROCEDURE — 84443 ASSAY THYROID STIM HORMONE: CPT

## 2021-12-21 PROCEDURE — 90686 IIV4 VACC NO PRSV 0.5 ML IM: CPT | Performed by: FAMILY MEDICINE

## 2021-12-21 PROCEDURE — 36415 COLL VENOUS BLD VENIPUNCTURE: CPT

## 2021-12-21 PROCEDURE — 90471 IMMUNIZATION ADMIN: CPT | Performed by: FAMILY MEDICINE

## 2021-12-21 PROCEDURE — 3078F DIAST BP <80 MM HG: CPT | Performed by: FAMILY MEDICINE

## 2021-12-21 PROCEDURE — 80061 LIPID PANEL: CPT

## 2021-12-21 PROCEDURE — 3008F BODY MASS INDEX DOCD: CPT | Performed by: FAMILY MEDICINE

## 2021-12-21 PROCEDURE — 85025 COMPLETE CBC W/AUTO DIFF WBC: CPT

## 2021-12-21 NOTE — PROGRESS NOTES
HPI:    Radha Chamberlain is a 42year old female presents to clinic for annual physical exam.  No acute concerns. Normal appetite, tries eat healthy foods. Normal bowel movements and urination. Normal sleep habits. Exercises regularly.   Anxiety-longsta normal.      Left Ear: Tympanic membrane, ear canal and external ear normal.      Nose: Nose normal.      Mouth/Throat:      Pharynx: Uvula midline.    Eyes:      Conjunctiva/sclera: Conjunctivae normal.      Pupils: Pupils are equal, round, and reactive to [[id number]]      Meds This Visit:  Requested Prescriptions      No prescriptions requested or ordered in this encounter       Imaging & Referrals:  FLULAVAL INFLUENZA VACCINE QUAD PRESERVATIVE FREE 0.5 ML  [facility] MARIJA 2D+3D SCREENING BILAT (CPT=[id number])

## 2022-02-16 ENCOUNTER — PATIENT MESSAGE (OUTPATIENT)
Dept: FAMILY MEDICINE CLINIC | Facility: CLINIC | Age: 42
End: 2022-02-16

## 2022-02-16 NOTE — TELEPHONE ENCOUNTER
Maryellen Hackett, RN 2/16/2022 2:14 PM CST        ----- Message -----  From: Ajay Najera  Sent: 2/16/2022 1:49 PM CST  To: Em Rn Triage  Subject: Email test results for Saad Lorenzo Didijae Blake Carlyn Olga    Would it be possible to email me our test results so we can upload our results to [employer] to qualify for the wellness incentive OR pls fax to [phone]    Thank you so much.

## 2022-02-23 ENCOUNTER — TELEPHONE (OUTPATIENT)
Dept: FAMILY MEDICINE CLINIC | Facility: CLINIC | Age: 42
End: 2022-02-23

## 2022-02-23 NOTE — TELEPHONE ENCOUNTER
Per patient, form was dropped off during the physical on 12/21. Please fax a copy of patient's physical and lab results to spouse's employer for health insurance discount.  Fax [phone]

## 2022-03-07 NOTE — TELEPHONE ENCOUNTER
Patient is calling to request that forms are faxed to alternate fax number as employer indicates forms never received. Please fax to:  [phone] to site staff to assist.  Please call patient and advise once fax sent.

## 2022-05-16 ENCOUNTER — HOSPITAL ENCOUNTER (OUTPATIENT)
Dept: MAMMOGRAPHY | Age: 42
Discharge: HOME | End: 2022-05-16
Attending: FAMILY MEDICINE
Payer: COMMERCIAL

## 2022-05-16 DIAGNOSIS — Z12.31 BREAST CANCER SCREENING BY MAMMOGRAM: ICD-10-CM

## 2022-05-16 PROCEDURE — 77063 BREAST TOMOSYNTHESIS BI: CPT | Performed by: FAMILY MEDICINE

## 2022-05-16 PROCEDURE — 77067 SCR MAMMO BI INCL CAD: CPT | Performed by: FAMILY MEDICINE

## 2023-08-30 NOTE — TELEPHONE ENCOUNTER
Faxed forms to patient insurance company. Received confirmation. Left message for patient to call office back. Immunization report was printed.

## (undated) NOTE — LETTER
[facility]  [phone]     Patient: Corine Zlueta   YOB: 1980   Date of Visit: 11/12/2020     Dear Employer,        November 12, 2020    At [facility], we are taking special Persons infected with SARS-CoV-2 who never develop COVID-19 symptoms may discontinue isolation and other precautions 10 days after the date of their first positive RT-PCR test for SARS-CoV-2 RNA.     Persons who are asymptomatic but have been exposed, CDC r